# Patient Record
(demographics unavailable — no encounter records)

---

## 2025-04-29 NOTE — HISTORY OF PRESENT ILLNESS
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   May 2024 [facility]   Chest pain recurrent once a week  Scale of 1/10: 8 takes ASA 81mg  pain last 30-60 mins No radiation to jaw, back arm +  palpitations, dyspnea Alleviating factors: rest, medication

## 2025-04-29 NOTE — DISCUSSION/SUMMARY
[FreeTextEntry1] : 52 yo f with CP at rest, "heart burn"  - CP exercise stress test ordered - HTN- controlled - HLD- check lipids -  recommend initiation with a PCP for asthma, GI/GERD, mammo, colonoscopy - follow up Nov 2024  after testing - Primary prevention of cardiovascular-related conditions discussed at length, including but not limited to diet and lifestyle modification.    Thank you for allowing me to participate in the care of your patient. If you have any questions, please feel free to contact me at .